# Patient Record
Sex: FEMALE | Race: BLACK OR AFRICAN AMERICAN | NOT HISPANIC OR LATINO | Employment: UNEMPLOYED | URBAN - METROPOLITAN AREA
[De-identification: names, ages, dates, MRNs, and addresses within clinical notes are randomized per-mention and may not be internally consistent; named-entity substitution may affect disease eponyms.]

---
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
## 2024-01-02 LAB
BACTERIAL VAGINOSIS VAG-IMP: NOT DETECTED
C ALBICANS DNA VAG QL NAA+PROBE: NOT DETECTED
C GLABRATA DNA VAG QL NAA+PROBE: POSITIVE
SERVICE CMNT-IMP: ABNORMAL
T VAGINALIS DNA VAG QL NAA+PROBE: NOT DETECTED

## 2024-01-03 ENCOUNTER — TELEPHONE (OUTPATIENT)
Dept: URGENT CARE | Age: 19
End: 2024-01-03

## 2024-01-03 DIAGNOSIS — B37.9 YEAST INFECTION: Primary | ICD-10-CM

## 2024-01-03 RX ORDER — FLUCONAZOLE 150 MG/1
TABLET ORAL
Qty: 2 TABLET | Refills: 0 | Status: SHIPPED | OUTPATIENT
Start: 2024-01-03

## 2024-01-09 ENCOUNTER — APPOINTMENT (OUTPATIENT)
Dept: LAB | Age: 19
End: 2024-01-09

## 2024-01-12 ENCOUNTER — TELEPHONE (OUTPATIENT)
Dept: FAMILY MEDICINE | Facility: CLINIC | Age: 19
End: 2024-01-12
Payer: COMMERCIAL

## 2024-01-12 DIAGNOSIS — Z01.84 IMMUNITY STATUS TESTING: Primary | ICD-10-CM

## 2024-01-12 NOTE — TELEPHONE ENCOUNTER
----- Message from Sheela Turner RN sent at 1/10/2024  8:00 AM EST -----  Regarding: FW: Hepatitis B and Chicken Pox Titers  Contact: [phone]    ----- Message -----  From: Denisa Skaggs  Sent: 1/9/2024   8:09 PM EST  To: [facility]  Subject: Hepatitis B and Chicken Pox Titers               Hello,  I have to get another titers for school after getting both shots. Would you be able to order me a titers in order to get the lab work and send it back to my school. Thank you so much.    Denisa Baeza

## 2024-01-12 NOTE — LETTER
[facility] Hold-Req - [facility] 0 of 0     Account #: [id number] Collection Date:    Bill Type: Third-Party    Lab Ref#: [id number] Collection Time:              Client / Ordering Site Information: Physician Information:   Account Name: [facility]   Address 1: [address]   Address 2: [facility] Zip: [place] [address]   Phone: [phone]    Ordering: Christina Moreno   Degree: MD   NPI: [id number]   UPIN:    Physician ID:              Patient Information:   Name: PITOBRITNITIARA   Legal Sex: Female   SSN: [id number]   Patient ID: [id number]    YOB: 2005 (18 years)   Phone: [phone]   Address: [address] [facility] [address] [address]                Responsible Party / Guarantor Information:   Name: EDITHROBERTBRITNITIARA   Address: [address] [facility] [address]   [facility] Zip: [place] [address]   Phone: [phone]   Relation to Pt: Self   Employer Name:            ABN:     Worker's Comp: N Date of Injury:                 Insurance Information:   Primary Insurance: Secondary Insurance:   Ins Co Name: [employer]ET[employer] POS   Address 1: [facility] [address]   Address 2:    [facility] Zip: [place] [address]   Policy Number: [id number]   Group #: [id number]    Ins Co Name:    Address 1:    Address 2:    City, State Zip:    Policy Number:    Group #:       Primary Policy Calder[place] / Insured: Secondary Policy Calderon / Insured:   Name: TRAN MCCLAIN   Address: [address] [facility] [address] [address]   Pt Relation to Subscriber: Child    Name:    Address:         Pt Relation to Subscriber:                 Comments:             Order Code Tests Ordered (Total: 2)    Order Code Tests Ordered      499 Hepatitis B surface antibody    4439 Varicella zoster antibody, IgG             Specimen Source:   (VZJ142) Hepatitis B surface antibody:  Blood, Venous    (JCE059) Varicella zoster antibody, IgG:  Blood, Venous                      Diagnosis Codes:   Z01.84                 Bill Type: Third-Party           E-Signature: Christina Moreno MD                            1/12/2024       Provider Signature                Date      For your convenience, you can download the [facility] lab scheduling milton to your smart phone. Schedule an appointment online at [email or web address], or call [phone] 24 hours a day, 7 days a week.

## 2024-01-16 ENCOUNTER — HOSPITAL ENCOUNTER (OUTPATIENT)
Dept: ULTRASOUND IMAGING | Age: 19
Discharge: HOME | End: 2024-01-16
Attending: NURSE PRACTITIONER

## 2024-01-16 DIAGNOSIS — R10.30 LOWER ABDOMINAL PAIN: ICD-10-CM

## 2024-01-16 PROCEDURE — 76830 TRANSVAGINAL US NON-OB: CPT | Performed by: RADIOLOGY

## 2024-01-16 PROCEDURE — 76856 US EXAM PELVIC COMPLETE: CPT | Performed by: RADIOLOGY

## 2024-01-16 PROCEDURE — 76830 TRANSVAGINAL US NON-OB: CPT

## 2024-01-17 ENCOUNTER — TELEPHONE (OUTPATIENT)
Dept: URGENT CARE | Age: 19
End: 2024-01-17

## 2024-01-29 ENCOUNTER — LAB SERVICES (OUTPATIENT)
Dept: LAB | Age: 19
End: 2024-01-29

## 2024-01-29 DIAGNOSIS — Z01.84 ENCOUNTER FOR ANTIBODY RESPONSE EXAMINATION: ICD-10-CM

## 2024-01-29 PROCEDURE — 86735 MUMPS ANTIBODY: CPT | Performed by: CLINICAL MEDICAL LABORATORY

## 2024-01-29 PROCEDURE — 36415 COLL VENOUS BLD VENIPUNCTURE: CPT | Performed by: INTERNAL MEDICINE

## 2024-01-29 PROCEDURE — 86787 VARICELLA-ZOSTER ANTIBODY: CPT | Performed by: CLINICAL MEDICAL LABORATORY

## 2024-01-29 PROCEDURE — 86765 RUBEOLA ANTIBODY: CPT | Performed by: CLINICAL MEDICAL LABORATORY

## 2024-01-29 PROCEDURE — 86762 RUBELLA ANTIBODY: CPT | Performed by: CLINICAL MEDICAL LABORATORY

## 2024-01-29 PROCEDURE — 86706 HEP B SURFACE ANTIBODY: CPT | Performed by: CLINICAL MEDICAL LABORATORY

## 2024-01-30 LAB
HBV SURFACE AB SER QL: POSITIVE
MEV IGG SER IA-ACNC: 26.8 AU/ML
MEV IGG SER QL IA: NORMAL
MUV IGG SER IA-ACNC: 34.8 AU/ML
MUV IGG SER QL IA: NORMAL
RUBV IGG SERPL IA-ACNC: 39 UNITS/ML
VZV IGG SER IA-ACNC: 728.7 INDEX
VZV IGG SER QL IA: NORMAL

## 2024-05-02 ENCOUNTER — OFFICE VISIT (OUTPATIENT)
Dept: OCCUPATIONAL MEDICINE | Age: 19
End: 2024-05-02

## 2024-05-02 ENCOUNTER — APPOINTMENT (OUTPATIENT)
Dept: LAB | Age: 19
End: 2024-05-02

## 2024-05-02 DIAGNOSIS — Z02.89 VISIT FOR OCCUPATIONAL HEALTH EXAMINATION: ICD-10-CM

## 2024-05-02 DIAGNOSIS — Z02.83 ENCOUNTER FOR EMPLOYMENT-RELATED DRUG TESTING: Primary | ICD-10-CM

## 2024-05-02 PROCEDURE — 36415 COLL VENOUS BLD VENIPUNCTURE: CPT | Performed by: NURSE PRACTITIONER

## 2024-05-03 ENCOUNTER — APPOINTMENT (OUTPATIENT)
Dept: LAB | Age: 19
End: 2024-05-03

## 2024-05-04 LAB
GAMMA INTERFERON BACKGROUND BLD IA-ACNC: 0.02 IU/ML
M TB IFN-G BLD-IMP: NEGATIVE
M TB IFN-G CD4+ BCKGRND COR BLD-ACNC: 0 IU/ML
M TB IFN-G CD4+CD8+ BCKGRND COR BLD-ACNC: 0 IU/ML
MITOGEN IGNF BCKGRD COR BLD-ACNC: >10 IU/ML

## 2024-05-06 ENCOUNTER — TELEPHONE (OUTPATIENT)
Dept: OCCUPATIONAL MEDICINE | Age: 19
End: 2024-05-06

## 2025-03-18 ENCOUNTER — TRANSCRIBE ORDERS (OUTPATIENT)
Dept: SCHEDULING | Age: 20
End: 2025-03-18

## 2025-03-18 DIAGNOSIS — M25.571 RIGHT ANKLE PAIN: Primary | ICD-10-CM

## 2025-03-19 ENCOUNTER — TRANSCRIBE ORDERS (OUTPATIENT)
Dept: RADIOLOGY | Age: 20
End: 2025-03-19

## 2025-03-19 DIAGNOSIS — M25.571 PAIN IN RIGHT ANKLE AND JOINTS OF RIGHT FOOT: Primary | ICD-10-CM

## 2025-03-26 ENCOUNTER — HOSPITAL ENCOUNTER (OUTPATIENT)
Dept: RADIOLOGY | Age: 20
Discharge: HOME | End: 2025-03-26
Attending: ORTHOPAEDIC SURGERY
Payer: COMMERCIAL

## 2025-03-26 DIAGNOSIS — M25.571 RIGHT ANKLE PAIN: ICD-10-CM

## 2025-03-26 DIAGNOSIS — M25.571 PAIN IN RIGHT ANKLE AND JOINTS OF RIGHT FOOT: ICD-10-CM

## 2025-03-26 PROCEDURE — 73610 X-RAY EXAM OF ANKLE: CPT | Mod: RT

## 2025-04-03 ENCOUNTER — APPOINTMENT (OUTPATIENT)
Dept: LAB | Age: 20
End: 2025-04-03

## 2025-04-03 ENCOUNTER — OFFICE VISIT (OUTPATIENT)
Dept: OCCUPATIONAL MEDICINE | Age: 20
End: 2025-04-03

## 2025-04-03 DIAGNOSIS — Z02.89 VISIT FOR OCCUPATIONAL HEALTH EXAMINATION: Primary | ICD-10-CM

## 2025-04-03 PROCEDURE — 36415 COLL VENOUS BLD VENIPUNCTURE: CPT

## 2025-04-04 LAB
GAMMA INTERFERON BACKGROUND BLD IA-ACNC: 0.02 IU/ML
M TB IFN-G BLD-IMP: NEGATIVE
M TB IFN-G CD4+ BCKGRND COR BLD-ACNC: 0.02 IU/ML
M TB IFN-G CD4+CD8+ BCKGRND COR BLD-ACNC: 0.05 IU/ML
MITOGEN IGNF BCKGRD COR BLD-ACNC: 7.95 IU/ML

## 2025-04-07 ENCOUNTER — TELEPHONE (OUTPATIENT)
Dept: OCCUPATIONAL MEDICINE | Age: 20
End: 2025-04-07

## 2025-08-14 ENCOUNTER — TELEPHONE (OUTPATIENT)
Dept: FAMILY MEDICINE | Facility: CLINIC | Age: 20
End: 2025-08-14
Payer: COMMERCIAL